# Patient Record
Sex: FEMALE | Race: WHITE | NOT HISPANIC OR LATINO | Employment: OTHER | URBAN - METROPOLITAN AREA
[De-identification: names, ages, dates, MRNs, and addresses within clinical notes are randomized per-mention and may not be internally consistent; named-entity substitution may affect disease eponyms.]

---

## 2018-01-15 ENCOUNTER — OFFICE VISIT CONVERTED (OUTPATIENT)
Dept: FAMILY MEDICINE CLINIC | Facility: CLINIC | Age: 78
End: 2018-01-15
Attending: FAMILY MEDICINE

## 2018-02-27 ENCOUNTER — OFFICE VISIT CONVERTED (OUTPATIENT)
Dept: FAMILY MEDICINE CLINIC | Facility: CLINIC | Age: 78
End: 2018-02-27
Attending: FAMILY MEDICINE

## 2018-03-15 ENCOUNTER — OFFICE VISIT CONVERTED (OUTPATIENT)
Dept: FAMILY MEDICINE CLINIC | Facility: CLINIC | Age: 78
End: 2018-03-15
Attending: FAMILY MEDICINE

## 2018-03-27 ENCOUNTER — OFFICE VISIT CONVERTED (OUTPATIENT)
Dept: FAMILY MEDICINE CLINIC | Facility: CLINIC | Age: 78
End: 2018-03-27
Attending: FAMILY MEDICINE

## 2020-11-07 ENCOUNTER — LAB REQUISITION (OUTPATIENT)
Dept: LAB | Facility: HOSPITAL | Age: 80
End: 2020-11-07

## 2020-11-07 DIAGNOSIS — Z00.00 ROUTINE GENERAL MEDICAL EXAMINATION AT A HEALTH CARE FACILITY: ICD-10-CM

## 2020-11-07 LAB
BASOPHILS # BLD AUTO: 0.02 10*3/MM3 (ref 0–0.2)
BASOPHILS NFR BLD AUTO: 0.5 % (ref 0–1.5)
DEPRECATED RDW RBC AUTO: 45.8 FL (ref 37–54)
EOSINOPHIL # BLD AUTO: 0 10*3/MM3 (ref 0–0.4)
EOSINOPHIL NFR BLD AUTO: 0 % (ref 0.3–6.2)
ERYTHROCYTE [DISTWIDTH] IN BLOOD BY AUTOMATED COUNT: 15.6 % (ref 12.3–15.4)
HCT VFR BLD AUTO: 40.1 % (ref 34–46.6)
HGB BLD-MCNC: 12.8 G/DL (ref 12–15.9)
IMM GRANULOCYTES # BLD AUTO: 0.02 10*3/MM3 (ref 0–0.05)
IMM GRANULOCYTES NFR BLD AUTO: 0.5 % (ref 0–0.5)
LYMPHOCYTES # BLD AUTO: 0.9 10*3/MM3 (ref 0.7–3.1)
LYMPHOCYTES NFR BLD AUTO: 22.6 % (ref 19.6–45.3)
MCH RBC QN AUTO: 26 PG (ref 26.6–33)
MCHC RBC AUTO-ENTMCNC: 31.9 G/DL (ref 31.5–35.7)
MCV RBC AUTO: 81.3 FL (ref 79–97)
MONOCYTES # BLD AUTO: 0.66 10*3/MM3 (ref 0.1–0.9)
MONOCYTES NFR BLD AUTO: 16.5 % (ref 5–12)
NEUTROPHILS NFR BLD AUTO: 2.39 10*3/MM3 (ref 1.7–7)
NEUTROPHILS NFR BLD AUTO: 59.9 % (ref 42.7–76)
NRBC BLD AUTO-RTO: 0.3 /100 WBC (ref 0–0.2)
PLATELET # BLD AUTO: 318 10*3/MM3 (ref 140–450)
PMV BLD AUTO: 9.7 FL (ref 6–12)
RBC # BLD AUTO: 4.93 10*6/MM3 (ref 3.77–5.28)
WBC # BLD AUTO: 3.99 10*3/MM3 (ref 3.4–10.8)

## 2020-11-07 PROCEDURE — 85025 COMPLETE CBC W/AUTO DIFF WBC: CPT

## 2021-05-16 VITALS
HEIGHT: 65 IN | TEMPERATURE: 97.3 F | DIASTOLIC BLOOD PRESSURE: 55 MMHG | OXYGEN SATURATION: 95 % | HEART RATE: 73 BPM | SYSTOLIC BLOOD PRESSURE: 127 MMHG | WEIGHT: 109 LBS | BODY MASS INDEX: 18.16 KG/M2

## 2021-05-16 VITALS
DIASTOLIC BLOOD PRESSURE: 48 MMHG | HEART RATE: 67 BPM | SYSTOLIC BLOOD PRESSURE: 91 MMHG | TEMPERATURE: 97.5 F | OXYGEN SATURATION: 100 % | HEIGHT: 65 IN

## 2021-05-16 VITALS
DIASTOLIC BLOOD PRESSURE: 53 MMHG | SYSTOLIC BLOOD PRESSURE: 105 MMHG | HEIGHT: 65 IN | WEIGHT: 119 LBS | BODY MASS INDEX: 19.83 KG/M2 | HEART RATE: 62 BPM | OXYGEN SATURATION: 99 %

## 2021-05-16 VITALS
OXYGEN SATURATION: 100 % | BODY MASS INDEX: 19.83 KG/M2 | HEART RATE: 71 BPM | HEIGHT: 65 IN | WEIGHT: 119 LBS | SYSTOLIC BLOOD PRESSURE: 99 MMHG | DIASTOLIC BLOOD PRESSURE: 53 MMHG

## 2022-10-27 ENCOUNTER — APPOINTMENT (OUTPATIENT)
Dept: CT IMAGING | Facility: HOSPITAL | Age: 82
End: 2022-10-27

## 2022-10-27 ENCOUNTER — APPOINTMENT (OUTPATIENT)
Dept: GENERAL RADIOLOGY | Facility: HOSPITAL | Age: 82
End: 2022-10-27

## 2022-10-27 ENCOUNTER — HOSPITAL ENCOUNTER (EMERGENCY)
Facility: HOSPITAL | Age: 82
Discharge: HOME | End: 2022-10-27
Attending: EMERGENCY MEDICINE | Admitting: EMERGENCY MEDICINE

## 2022-10-27 VITALS
RESPIRATION RATE: 18 BRPM | DIASTOLIC BLOOD PRESSURE: 127 MMHG | WEIGHT: 103.4 LBS | BODY MASS INDEX: 16.23 KG/M2 | TEMPERATURE: 97.5 F | OXYGEN SATURATION: 94 % | SYSTOLIC BLOOD PRESSURE: 150 MMHG | HEIGHT: 67 IN | HEART RATE: 98 BPM

## 2022-10-27 DIAGNOSIS — S42.009A CLOSED NONDISPLACED FRACTURE OF CLAVICLE, UNSPECIFIED LATERALITY, UNSPECIFIED PART OF CLAVICLE, INITIAL ENCOUNTER: Primary | ICD-10-CM

## 2022-10-27 PROCEDURE — 70450 CT HEAD/BRAIN W/O DYE: CPT

## 2022-10-27 PROCEDURE — 73030 X-RAY EXAM OF SHOULDER: CPT

## 2022-10-27 PROCEDURE — 99283 EMERGENCY DEPT VISIT LOW MDM: CPT

## 2022-10-27 PROCEDURE — 71045 X-RAY EXAM CHEST 1 VIEW: CPT

## 2022-10-27 RX ORDER — TRAMADOL HYDROCHLORIDE 50 MG/1
50 TABLET ORAL EVERY 6 HOURS PRN
Qty: 15 TABLET | Refills: 0 | Status: SHIPPED | OUTPATIENT
Start: 2022-10-27

## 2022-10-31 ENCOUNTER — TELEPHONE (OUTPATIENT)
Dept: ORTHOPEDIC SURGERY | Facility: CLINIC | Age: 82
End: 2022-10-31

## 2022-10-31 NOTE — TELEPHONE ENCOUNTER
FRACTURE OF RIGHT CLAVICLE - XR 10/27/22  
Poor historian. Has cough present with red rash that has peeling and papulles on legs, chest abd, arms.
Yes

## 2022-11-07 ENCOUNTER — TELEPHONE (OUTPATIENT)
Dept: ORTHOPEDIC SURGERY | Facility: CLINIC | Age: 82
End: 2022-11-07

## 2022-11-07 ENCOUNTER — OFFICE VISIT (OUTPATIENT)
Dept: ORTHOPEDIC SURGERY | Facility: CLINIC | Age: 82
End: 2022-11-07

## 2022-11-07 VITALS — BODY MASS INDEX: 16.17 KG/M2 | WEIGHT: 103 LBS | HEART RATE: 55 BPM | OXYGEN SATURATION: 96 % | HEIGHT: 67 IN

## 2022-11-07 DIAGNOSIS — M89.8X1 PAIN OF RIGHT CLAVICLE: Primary | ICD-10-CM

## 2022-11-07 DIAGNOSIS — S42.001A CLOSED NONDISPLACED FRACTURE OF RIGHT CLAVICLE, UNSPECIFIED PART OF CLAVICLE, INITIAL ENCOUNTER: ICD-10-CM

## 2022-11-07 PROCEDURE — 99203 OFFICE O/P NEW LOW 30 MIN: CPT | Performed by: STUDENT IN AN ORGANIZED HEALTH CARE EDUCATION/TRAINING PROGRAM

## 2022-11-07 RX ORDER — LORAZEPAM 1 MG/1
TABLET ORAL
COMMUNITY
Start: 2022-10-27

## 2022-11-07 RX ORDER — QUETIAPINE FUMARATE 25 MG/1
TABLET, FILM COATED ORAL
COMMUNITY
Start: 2022-10-09

## 2022-11-07 RX ORDER — LACTULOSE 10 G/15ML
SOLUTION ORAL
COMMUNITY
Start: 2022-10-31

## 2022-11-07 RX ORDER — ESCITALOPRAM OXALATE 10 MG/1
TABLET ORAL
COMMUNITY
Start: 2022-10-31

## 2022-11-07 RX ORDER — CIPROFLOXACIN 250 MG/1
TABLET, FILM COATED ORAL
COMMUNITY

## 2022-11-07 RX ORDER — LORATADINE 10 MG/1
TABLET ORAL
COMMUNITY

## 2022-11-07 RX ORDER — ROPINIROLE 0.5 MG/1
TABLET, FILM COATED ORAL
COMMUNITY
Start: 2022-11-07

## 2022-11-07 RX ORDER — PANTOPRAZOLE SODIUM 20 MG/1
TABLET, DELAYED RELEASE ORAL
COMMUNITY
Start: 2022-10-31

## 2022-11-07 RX ORDER — MIRTAZAPINE 15 MG/1
TABLET, FILM COATED ORAL
COMMUNITY
Start: 2022-10-15

## 2022-11-07 NOTE — PROGRESS NOTES
"Chief Complaint  Initial Evaluation and Pain of the Right Clavicle    Will Vasquez presents to [facility] for   History of Present Illness    The patient presents here today for evaluation of the right clavicle. She is here today with a worker from the facility. They are unaware of the details of when the fall happened. The patient is not wearing a sling today. She was seen at the ER on 10/27/22 for a witnessed fall striking the right arm. The history is limited due to dementia. She is in a wheelchair today but states she does walk with assistance.   Allergies   Allergen Reactions   • Penicillins Unknown - High Severity        Social History     Socioeconomic History   • Marital status: Other   Tobacco Use   • Smoking status: Former     Types: Cigarettes   • Smokeless tobacco: Never   Vaping Use   • Vaping Use: Never used        I reviewed the patient's chief complaint, history of present illness, review of systems, past medical history, surgical history, family history, social history, medications, and allergy list.     REVIEW OF SYSTEMS    Constitutional: Denies fevers, chills, weight loss  Cardiovascular: Denies chest pain, shortness of breath  Skin: Denies rashes, acute skin changes  Neurologic: Denies headache, loss of consciousness  MSK: right upper extremity pain.       Objective   Vital Signs:   Pulse 55   Ht 170.2 cm (67\")   Wt 46.7 kg (103 lb)   SpO2 96%   BMI 16.13 kg/m²     Body mass index is 16.13 kg/m².    Physical Exam    General: Alert. No acute distress.   Right upper extremity- Bruising along the chest wall. No wounds. Tender to the distal clavicle. No obvious deformities. Forward elevation active 90. External Rotation 30. Internal rotation back pocket. Sensation to light touch median, radial, ulnar nerve. Positive AIN, PIN, ulnar nerve motor function. Full elbow motion and wrist motion.  Palpable radial pulse.    Procedures    Imaging Results " (Most Recent)     Procedure Component Value Units Date/Time    XR Clavicle Right [[id number]] Resulted: 11/07/22 1433     Updated: 11/07/22 1435    Narrative:      Indications: Follow-up right distal clavicle fracture    Views: AP and oblique views right clavicle    Findings: Minimally displaced distal clavicle fracture again seen.    Alignment appears stable overall.  Views are suboptimal.  No other   fractures noted.    Comparative Data: Comparative data found and reviewed today                     Assessment and Plan {CC Problem List  Visit Diagnosis   ROS  Review (Popup)  [facility]  Quality  BestPractice  Medications  SmartSets  SnapShot Encounters  Media :23}       XR Clavicle Right    Result Date: 11/7/2022  Narrative: Indications: Follow-up right distal clavicle fracture Views: AP and oblique views right clavicle Findings: Minimally displaced distal clavicle fracture again seen.  Alignment appears stable overall.  Views are suboptimal.  No other fractures noted. Comparative Data: Comparative data found and reviewed today     XR Shoulder 2+ View Right    Result Date: 10/27/2022  Narrative: PROCEDURE: XR SHOULDER 2+ VW RIGHT  COMPARISON: None  INDICATIONS: ANTERIOR RIGHT SHOULDER PAIN /POST FALL  FINDINGS:  There is a mildly displaced fracture along the distal clavicle.  No focal osseous abnormality is identified.  There are severe degenerative changes along the glenohumeral joint, and moderate degenerative changes along the acromioclavicular joint.  There are a couple ossicles along inferior shoulder joint, possibly osseous loose bodies.  The soft tissues are unremarkable.      Impression:  Mildly displaced fracture along distal clavicle.      JOSÉ LIND MD       Electronically Signed and Approved By: JOSÉ LIND MD on 10/27/2022 at 12:01             CT Head Without Contrast    Result Date: 10/27/2022  Narrative: PROCEDURE: CT HEAD WO CONTRAST  COMPARISON:  [facility]  [facility], CT, HEAD W/O CONTRAST, 10/09/2020, 17:48.  INDICATIONS: altered mental status  PROTOCOL:   Standard imaging protocol performed    RADIATION:   DLP: 2286.9mGy*cm   MA and/or KV was adjusted to minimize radiation dose.    TECHNIQUE: CT images were obtained without non-ionic intravenous contrast material.  FINDINGS:  The ventricles, sulci, and cerebellar folia are moderately and diffusely prominent consistent with atrophy.  Ill-defined diminished density in cerebral white matter is consistent with moderate gliosis and/or numerous lacunar infarcts.  There is no CT evidence of acute intracranial hemorrhage, mass, or mass effect.  The orbits have a normal appearance.  The paranasal sinuses, middle ears, and mastoid air cells are well aerated.      Impression:   CT scan of the head without IV contrast demonstrating diffuse atrophy and white matter changes.  No acute intracranial abnormality is seen.     ADRIANNA TRIPATHI MD       Electronically Signed and Approved By: ADRIANNA TRIPATHI MD on 10/27/2022 at 12:06             XR Chest 1 View    Result Date: 10/27/2022  Narrative: PROCEDURE: XR CHEST 1 VW  COMPARISON: [facility], , CHEST AP/PA 1 VIEW, 10/09/2020, 17:39.  INDICATIONS: RIGHT SHOULDER PAIN POST FALL  FINDINGS:  LUNGS: Normal.  No significant pulmonary parenchymal abnormalities.  VASCULATURE: Normal.  Unremarkable pulmonary vasculature.  CARDIAC: Normal.  No cardiac silhouette abnormality or cardiomegaly.  MEDIASTINUM: Normal.  No visible mass or adenopathy.  PLEURA: Normal.  No effusion or pleural thickening.  BONES: There is a mildly displaced fracture along the right distal clavicle. OTHER: Negative.       Impression:   1. No acute cardiopulmonary process identified. 2. Mildly displaced fracture along right distal clavicle.       JOSÉ LIND MD       Electronically Signed and Approved By: JOSÉ LIND MD on 10/27/2022 at 12:02                Diagnoses and all orders for this  visit:    1. Pain of right clavicle (Primary)  -     XR Clavicle Right    2. Closed nondisplaced fracture of right clavicle, unspecified part of clavicle, initial encounter        Discussed the treatment plan with the patient.  I reviewed the x-rays that were obtained today with the patient. Plan for conservative treatment at this time. She can do light ADL type activities at chest level. I advised her no over head activity or lifting.       Will obtain X-Rays of Right clavicle at next visit.     Educated on risk of smoking. Discussed options for smoking cessation.   Call or return if worsening symptoms.    Scribed for Fercho Valladares MD by Fernanda Pate  11/07/2022   13:46 EST         Follow Up       2 weeks    Patient was given instructions and counseling regarding her condition or for health maintenance advice. Please see specific information pulled into the AVS if appropriate.       I have personally performed the services described in this document as scribed by the above individual and it is both accurate and complete.     Fercho Valladares MD  11/07/22  15:23 EST